# Patient Record
Sex: MALE | Race: ASIAN | URBAN - METROPOLITAN AREA
[De-identification: names, ages, dates, MRNs, and addresses within clinical notes are randomized per-mention and may not be internally consistent; named-entity substitution may affect disease eponyms.]

---

## 2018-05-31 DIAGNOSIS — H35.713 CENTRAL SEROUS CHORIORETINOPATHY OF BOTH EYES: Primary | ICD-10-CM

## 2018-06-14 ENCOUNTER — TELEPHONE (OUTPATIENT)
Dept: OPHTHALMOLOGY | Facility: CLINIC | Age: 63
End: 2018-06-14

## 2018-06-14 NOTE — TELEPHONE ENCOUNTER
Pt recently seen at [facility] by dr. Lambert  H/o central serus retinopathy    Pt been on spirolactone for one month and ketorolac and new improvement-- increase fluid after one month at exam 5-31-18    Referring to [place] for PDT    Note being faxed    Will review with facilitator scheduling and schedule pt  Nelson Higgins RN 7:54 AM 06/14/18

## 2018-06-18 DIAGNOSIS — H35.713 CENTRAL SEROUS CHORIORETINOPATHY OF BOTH EYES: Primary | ICD-10-CM

## 2018-07-12 ENCOUNTER — TELEPHONE (OUTPATIENT)
Dept: OPHTHALMOLOGY | Facility: CLINIC | Age: 63
End: 2018-07-12

## 2018-07-12 NOTE — TELEPHONE ENCOUNTER
----- Message from Rafaela Andrade sent at 7/10/2018 11:14 AM CDT -----  Thank you. I left a message via Tongan  at that phone number provided [phone].    Rafaela Andrade   - CAM & Infusion  [facility]  [email or web address]  [email or web address]   Office: [phone]  Fax: [phone]    ----- Message -----     From: Thea Medina COT     Sent: 7/9/2018   4:32 PM       To: Rafaela Russo,  Also got another number from his other clinic [phone]  ----- Message -----     From: Rafaela Andrade     Sent: 6/29/2018   2:49 PM       To: DELTA Roy,    I've tried calling this patient a couple of times now but could not leave a message. Please direct the patient to call me at [phone] if he reaches out to the clinic.    Thanks,    Rafaela Andrade   - CAM & Infusion  [facility]  [email or web address]  [email or web address]   Office: [phone]  Fax: [phone]    ----- Message -----     From: Thea Medina COT     Sent: 6/21/2018   7:42 AM       To: Dr Fausto Rojas from [facility] is the one referring the pt here for treatment. He wants us to call the pt and discuss cost so if you would be willing to call him I would appreciate that. You will need a Tongan  which makes it harder to explain things. Sorry for the inconvenience but THANKS A LOT  ----- Message -----     From: Rafaela Andrade     Sent: 6/19/2018   2:58 PM       To: DELTA Roy    No problem. If patient would still like to receive treatment, I can contact the patient about the cost of treatment.    Thanks,    Rafaela Andrade   - CAM & Infusion  [facility]  [email or web address]  [email or web address]   Office: [phone]  Fax: [phone]    ----- Message -----     From: Thea Medina COT     Sent: 6/19/2018   2:49 PM       To: Rafaela  Raymond    Oh that's right, thanks for the reminder   ----- Message -----     From: Rafaela Andrade     Sent: 6/19/2018   8:29 AM       To: DELTA oRy,    This is the same patient that I told you only had insurance coverage for behavioral health. She does not have any medical coverage to be seen at the eye clinic.    Rafaela Andrade   - CAM & Infusion  [facility]  [email or web address]  [email or web address]   Office: [phone]  Fax: [phone]    ----- Message -----     From: Thea Medina COT     Sent: 6/19/2018   8:13 AM       To: Rafaela quiroz for insurance check   [id number]  Thanks

## 2018-07-26 ENCOUNTER — TELEPHONE (OUTPATIENT)
Dept: OPHTHALMOLOGY | Facility: CLINIC | Age: 63
End: 2018-07-26

## 2018-07-26 NOTE — TELEPHONE ENCOUNTER
----- Message from Rafaela Andrade sent at 7/24/2018 12:01 PM CDT -----  Hello,    Patient finally called back and provided the correct insurance. He is good to go.    Thanks,    Rafaela Andrade   - CAM & Infusion  [facility]  [email or web address]  [email or web address]   Office: [phone]  Fax: [phone]    ----- Message -----     From: Thea Medina COT     Sent: 7/9/2018   4:32 PM       To: Rafaela Russo,  Also got another number from his other clinic [phone]  ----- Message -----     From: Rafaela Andrade     Sent: 6/29/2018   2:49 PM       To: DELTA Roy,    I've tried calling this patient a couple of times now but could not leave a message. Please direct the patient to call me at [phone] if he reaches out to the clinic.    Thanks,    Rafaela Andrade   - CAM & Infusion  [facility]  [email or web address]  [email or web address]   Office: [phone]  Fax: [phone]    ----- Message -----     From: Thea Medina COT     Sent: 6/21/2018   7:42 AM       To: Rafaela Russo,  Dr Lambert from [facility] is the one referring the pt here for treatment. He wants us to call the pt and discuss cost so if you would be willing to call him I would appreciate that. You will need a Malaysian  which makes it harder to explain things. Sorry for the inconvenience but THANKS A LOT  ----- Message -----     From: Rafaela Andrade     Sent: 6/19/2018   2:58 PM       To: DELTA Roy    No problem. If patient would still like to receive treatment, I can contact the patient about the cost of treatment.    Thanks,    Rafaela Andrade   - CAM & Infusion  [facility]  [email or web address]  [email or web address]   Office: [phone]  Fax: [phone]    ----- Message -----     From: Thea Medina COT     Sent: 6/19/2018   2:49 PM       To: Rafaela  Raymond    Oh that's right, thanks for the reminder   ----- Message -----     From: Rafaela Andrade     Sent: 6/19/2018   8:29 AM       To: DELTA Roy,    This is the same patient that I told you only had insurance coverage for behavioral health. She does not have any medical coverage to be seen at the eye clinic.    Rafaela Andrade   - CAM & Infusion  [facility]  [email or web address]  [email or web address]   Office: [phone]  Fax: [phone]    ----- Message -----     From: Thea Medina COT     Sent: 6/19/2018   8:13 AM       To: Rafaela quiroz for insurance check   [id number]  Thanks     Explained to pt to bring a , needs to stay out of sun x 3 days, will send pamphlet and new pt package in mail.  Phoned thru interp services.

## 2018-08-15 ENCOUNTER — OFFICE VISIT (OUTPATIENT)
Dept: OPHTHALMOLOGY | Facility: CLINIC | Age: 63
End: 2018-08-15
Attending: OPHTHALMOLOGY
Payer: COMMERCIAL

## 2018-08-15 VITALS — HEIGHT: 61 IN | BODY MASS INDEX: 21.84 KG/M2 | WEIGHT: 115.7 LBS

## 2018-08-15 DIAGNOSIS — H35.713 CENTRAL SEROUS CHORIORETINOPATHY OF BOTH EYES: Primary | ICD-10-CM

## 2018-08-15 PROCEDURE — 25000128 H RX IP 250 OP 636: Mod: ZF | Performed by: OPHTHALMOLOGY

## 2018-08-15 PROCEDURE — 92242 FLUORESCEIN&ICG ANGIOGRAPHY: CPT

## 2018-08-15 PROCEDURE — G0463 HOSPITAL OUTPT CLINIC VISIT: HCPCS | Mod: 25,ZF

## 2018-08-15 PROCEDURE — 92240 ICG ANGIOGRAPHY I&R UNI/BI: CPT | Mod: ZF | Performed by: OPHTHALMOLOGY

## 2018-08-15 PROCEDURE — 92250 FUNDUS PHOTOGRAPHY W/I&R: CPT | Mod: ZF | Performed by: OPHTHALMOLOGY

## 2018-08-15 PROCEDURE — 92134 CPTRZ OPH DX IMG PST SGM RTA: CPT | Mod: ZF | Performed by: OPHTHALMOLOGY

## 2018-08-15 PROCEDURE — 92235 FLUORESCEIN ANGRPH MLTIFRAME: CPT | Mod: ZF | Performed by: OPHTHALMOLOGY

## 2018-08-15 PROCEDURE — 67221 OCULAR PHOTODYNAMIC THER: CPT | Mod: LT,ZF | Performed by: OPHTHALMOLOGY

## 2018-08-15 RX ORDER — TADALAFIL 20 MG/1
20 TABLET ORAL PRN
COMMUNITY
Start: 2018-06-19

## 2018-08-15 RX ADMIN — VERTEPORFIN FOR INJECTION 9 MG: 15 INJECTION, POWDER, LYOPHILIZED, FOR SOLUTION INTRAVENOUS at 12:15

## 2018-08-15 ASSESSMENT — TONOMETRY
OS_IOP_MMHG: 11
OD_IOP_MMHG: 10
IOP_METHOD: ICARE

## 2018-08-15 ASSESSMENT — VISUAL ACUITY
OS_PH_SC: 20/70-2
OD_SC+: -1
OD_PH_SC: 20/70-2
OS_SC: 20/80
OD_SC: 20/100
METHOD: SNELLEN - LINEAR

## 2018-08-15 ASSESSMENT — CONF VISUAL FIELD
OD_NORMAL: 1
OS_NORMAL: 1
METHOD: COUNTING FINGERS

## 2018-08-15 ASSESSMENT — SLIT LAMP EXAM - LIDS
COMMENTS: NORMAL
COMMENTS: NORMAL

## 2018-08-15 NOTE — LETTER
"8/15/2018       RE: Kayode South  [address]  [place] [address]     Dear Meggan,    Thank you for referring your patient, Kaydoe South, [facility] Please see a copy of my visit note below.    CC: PDT evaluation for chronic Central serous chorioretinopathy    HPI: Kayode South is a  63 year old year-old patient referred by Dr Lambert from [facility] for evaluation and treat of possible Central serous retinopathy both eyes.  Has been having recurrences for approximately two years and have been evaluated by Dr. Castro in the past.    Had previously been on inspra but the patient stopped this as he did not feel that it was working. No recent history of steroid medication use (oral or inhalants).  He does have cialis on his med list but says he does not take it and got it prescribed for a friend.  Does smoke and drinks coffee daily. He feels that his visual \"black spot\" is larger than before in both eyes (left>>right).  He is hopeful to receive the laser therapy today.    Current Eye Meds:   Ketorolac both eyes three times a day    Retinal Imaging:    OCT 08/15/18   RE: Small pigment epithelial detachment, small pocket of subretinal fluid temporal macula increased compared to previous  LE:  > 338 > 445 Subretinal fluid increased, no intraretinal fluid    FAF 08/15/18  RE: mottled hyperAF temp juxafoveal and nasal macula with hypoAF juxtafoveally nasally  LE: well defined parafoveal hyperAF circumfrencial with surrounding hypoAF, central hypoAF    FA and ICG 08/15/18 transits left  Left eye: early hyperF dot superior to the fovea that expands/leaks into the late frames, also apparent on ICG  Right eye: multiple areas of hyperF foveal and nasal macula, also apparent on ICG       Assessment and Plan:    1. Central serous retinopathy, both eyes   - OCT shows evidence of prior subretinal fluid both eyes, current subretinal fluid left eye increased compared " to last Optical Coherence Tomography   - Discussed cessation of smoking, reducing coffee consumption, primary care physician evalation of sleep apnea, avoidance of steroids and viagra   - Discussed possibility of avastin injections as a treatment for central serous chorioretinopathy, patient explicitly prefers the laser therapy at this time after discussion   - Discussed possible need for multiple treatments with different methods for improvement   - R/B/A to PDT therapy in the left eye discussed at length, consent   - FA/ICG transits left with expansile dot appearance, both eyes   - PDT, left eye today    2. Nuclear Sclerosis, both eyes   - Observe    RTC in 4 weeks with OCT both eyes    Again, thank you for allowing me to participate in the care of your patient.      Sincerely,    Rhona Srivastava MD     Retina Service   Department of Ophthalmology and Visual Neurosciences   [facility]  Phone:  [phone]   Fax:  [phone]

## 2018-08-15 NOTE — LETTER
August 15, 2018      Re: Kayode South   1955    To Whom It May Concern:    This is to confirm that the above patient was seen on 8/15/2018.  Kayode South is unable to return to work til   Monday August 20, 2018.    Thank you for your cooperation in this matter.  Please do not hesitate to contact me if you have any further questions.    Sincerely,         [facility]

## 2018-08-15 NOTE — NURSING NOTE
"Chief Complaints and History of Present Illnesses   Patient presents with     Follow Up For     3mo recheck bilateral  + possible PDT LE     HPI    Affected eye(s):  Both   Symptoms:     Blurred vision   Decreased vision   Difficulty with reading   Inability to meet visual needs of job   No double vision   Floaters   No redness   No foreign body sensation   No tearing   Dryness      Duration:  3 months   Frequency:  Daily       Do you have eye pain now?:  No      Comments:  Accompanied by Israeli  Arturo and wife Freda    Per , Pt c/o intermittent blurriness, especially \"when the temperate changes,\" Pt will only be able to look at the computer for a few minutes at a time.     Wears SVL gls but c/o difficulty with vision at near and some in the distance. Gls are 2yrs old; didn't bring them in today. Is not interested in an MRx unless \"eyes are healthy,\" and \"Dr Srivastava recommends an update.\" Confirms DE symptoms, tx ATs PRN.     New Floaters LE since LV. \"Large black one,\" no Flashes.     Ocular meds: Ketorolac tid BE, ATs  Oriana Osborne COT 8:30 AM August 15, 2018                "

## 2018-08-15 NOTE — MR AVS SNAPSHOT
After Visit Summary   8/15/2018    Kayode South    MRN: [id number]           Patient Information     Date Of Birth          1955        Visit Information        Provider Department      8/15/2018 8:00 AM Rhona Srivastava MD; [facility]        Today's Diagnoses     Central serous chorioretinopathy of both eyes    -  1       Follow-ups after your visit        Follow-up notes from your care team     Return in about 4 weeks (around 9/12/2018) for Follow Up mac oct ou post PDT.      Your next 10 appointments already scheduled     Sep 12, 2018  8:00 AM CDT   RETURN RETINA with Rhona Srivastava MD   Eye Clinic ([facility])    [address]  [facility] [address] [phone]              Future tests that were ordered for you today     Open Future Orders        Priority Expected Expires Ordered    OCT Retina Spectralis OU (both eyes) Routine  2/16/2020 8/15/2018            Who to contact     Please call your clinic at [phone] to:    Ask questions about your health    Make or cancel appointments    Discuss your medicines    Learn about your test results    Speak to your doctor            Additional Information About Your Visit        MyChart Information     [employer] gives you secure access to your electronic health record. If you see a primary care provider, you can also send messages to your care team and make appointments. If you have questions, please call your primary care clinic.  If you do not have a primary care provider, please call [phone] and they will assist you.      [employer] is an electronic gateway that provides easy, online access to your medical records. With [employer], you can request a clinic appointment, read your test results, renew a prescription or communicate with your care team.     To access your existing account, please contact your [facility]  "Clinic or call [phone] for assistance.        Care EveryWhere ID     This is your Care EveryWhere ID. This could be used by other organizations to access your [facility] medical records  [id number]        Your Vitals Were     Height BMI (Body Mass Index)                1.549 m (5' 1\") 21.86 kg/m2           Blood Pressure from Last 3 Encounters:   No data found for BP    Weight from Last 3 Encounters:   08/15/18 52.5 kg (115 lb 11.2 oz)              We Performed the Following     Fluorescein Angiography OU (both eyes)     Fundus Autofluorescence Image (FAF) OU (both eyes)     ICG Angiography OU (both eyes)     OCT Retina Spectralis OU (both eyes)        Primary Care Provider    None Specified       No primary provider on file.        Equal Access to Services     JENNIFER OLIVAS : Sayra Campos, chuckie han, rachael reyes, urbano dias. So [facility] [phone].    ATENCIÓN: Si habla español, tiene a mitchell disposición servicios gratuitos de asistencia lingüística. Llame al [phone].    We comply with applicable federal civil rights laws and [place] laws. We do not discriminate on the basis of race, color, national origin, age, disability, sex, sexual orientation, or gender identity.            Thank you!     Thank you for choosing EYE CLINIC  for your care. Our goal is always to provide you with excellent care. Hearing back from our patients is one way we can continue to improve our services. Please take a few minutes to complete the written survey that you may receive in the mail after your visit with us. Thank you!             Your Updated Medication List - Protect others around you: Learn how to safely use, store and throw away your medicines at www.disposemymeds.org.          This list is accurate as of 8/15/18 12:31 PM.  Always use your most recent med list.                   Brand Name Dispense Instructions for use Diagnosis    ketorolac 0.5 % " ophthalmic solution    ACULAR     Place 1 drop into both eyes 3 times daily        * TADALAFIL PO           * tadalafil 20 MG tablet    CIALIS     Take 20 mg by mouth as needed        * Notice:  This list has 2 medication(s) that are the same as other medications prescribed for you. Read the directions carefully, and ask your doctor or other care provider to review them with you.

## 2018-08-15 NOTE — PROGRESS NOTES
"CC: PDT evaluation for chronic Central serous chorioretinopathy    HPI: Kayode South is a  63 year old year-old patient referred by Dr Lambert from [facility] for evaluation and treat of possible Central serous retinopathy both eyes.  Has been having recurrences for approximately two years and have been evaluated by Dr. Castro in the past.    Had previously been on inspra but the patient stopped this as he did not feel that it was working. No recent history of steroid medication use (oral or inhalants).  He does have cialis on his med list but says he does not take it and got it prescribed for a friend.  Does smoke and drinks coffee daily. He feels that his visual \"black spot\" is larger than before in both eyes (left>>right).  He is hopeful to receive the laser therapy today.    Current Eye Meds:   Ketorolac both eyes three times a day    Retinal Imaging:    OCT 08/15/18   RE: Small pigment epithelial detachment, small pocket of subretinal fluid temporal macula increased compared to previous  LE:  > 338 > 445 Subretinal fluid increased, no intraretinal fluid    FAF 08/15/18  RE: mottled hyperAF temp juxafoveal and nasal macula with hypoAF juxtafoveally nasally  LE: well defined parafoveal hyperAF circumfrencial with surrounding hypoAF, central hypoAF    FA and ICG 08/15/18 transits left  Left eye: early hyperF dot superior to the fovea that expands/leaks into the late frames, also apparent on ICG  Right eye: multiple areas of hyperF foveal and nasal macula, also apparent on ICG       Assessment and Plan:    1. Central serous retinopathy, both eyes   - OCT shows evidence of prior subretinal fluid both eyes, current subretinal fluid left eye increased compared to last Optical Coherence Tomography   - Discussed cessation of smoking, reducing coffee consumption, primary care physician evalation of sleep apnea, avoidance of steroids and viagra   - Discussed possibility of avastin injections as a treatment for " central serous chorioretinopathy, patient explicitly prefers the laser therapy at this time after discussion   - Discussed possible need for multiple treatments with different methods for improvement   - R/B/A to PDT therapy in the left eye discussed at length, consent   - FA/ICG transits left with expansile dot appearance, both eyes   - PDT, left eye today    2. Nuclear Sclerosis, both eyes   - Observe    RTC in 4 weeks with OCT both eyes    Ramesh Lu M.D.  PGY-3, Ophthalmology     ~~~~~~~~~~~~~~~~~~~~~~~~~~~~~~~~~~   Complete documentation of historical and exam elements from today's encounter can be found in the full encounter summary report (not reduplicated in this progress note).  I personally obtained the chief complaint(s) and history of present illness.  I confirmed and edited as necessary the review of systems, past medical/surgical history, family history, social history, and examination findings as documented by others; and I examined the patient myself.  I personally reviewed the relevant tests, images, and reports as documented above.  I formulated and edited as necessary the assessment and plan and discussed the findings and management plan with the patient and family and No resident or fellow assisted with the procedures performed.  I performed the procedures myself.    Rhona Srivastava MD   of Ophthalmology.  Retina Service   Department of Ophthalmology and Visual Neurosciences   [facility]  Phone: [phone]   Fax: [phone]

## 2018-08-17 ENCOUNTER — TELEPHONE (OUTPATIENT)
Dept: OPHTHALMOLOGY | Facility: CLINIC | Age: 63
End: 2018-08-17

## 2018-08-17 NOTE — TELEPHONE ENCOUNTER
"The patient called asking what he should do after \"eye surgery\".  He had a laser last week.  He notes his eyes are comfortable.  I mentioned that he could use OTC artificial tears.  He asked if he could take a shower.  I went through the general notes on the doctors visit note and he states he understands about smoking cessation.  The patient thanked me for answering his questions.   "

## 2018-08-22 ENCOUNTER — TELEPHONE (OUTPATIENT)
Dept: OPHTHALMOLOGY | Facility: CLINIC | Age: 63
End: 2018-08-22

## 2018-08-22 NOTE — TELEPHONE ENCOUNTER
[facility] Call Center    Phone Message    May a detailed message be left on voicemail: yes    Reason for Call: Other: PT called stating he has not felt any change since his procedure done by Dr. Srivastava last Tuesday. Pt wants call on what to do.     Action Taken: Message routed to:  [facility] ([facility] Ophthamology

## 2018-08-23 ENCOUNTER — TELEPHONE (OUTPATIENT)
Dept: OPHTHALMOLOGY | Facility: CLINIC | Age: 63
End: 2018-08-23

## 2018-08-23 NOTE — TELEPHONE ENCOUNTER
[facility] Call Center    Phone Message    May a detailed message be left on voicemail: yes    Reason for Call: Symptoms or Concerns     If patient has red-flag symptoms, warm transfer to triage line    Current symptom or concern: Per pt, had eye surgery/Lasix on 8/15/2018, but vision has not changed. Please follow up.    Symptoms have been present for:  1 week(s)    Has patient previously been seen for this? Yes    By Dr. Srivastava    Date: 8/15/2018    Are there any new or worsening symptoms? No      Action Taken: Message routed to:  [facility] ([facility] EYE

## 2018-08-24 NOTE — TELEPHONE ENCOUNTER
Pt. Was confused with PDT laser procedure he had done. He had talked to friends that had Lasik surgery and told him their vision improved significantly right away. I explained to patient that there are many different types of lasers. I explained that PDT is used to attempt to stabilize and reduce the fluid. Pt. Understood that it guallpa not dramatically improve vision like lasik would.     Kim SORENSON 7:58 AM August 24, 2018

## 2018-09-12 ENCOUNTER — OFFICE VISIT (OUTPATIENT)
Dept: OPHTHALMOLOGY | Facility: CLINIC | Age: 63
End: 2018-09-12
Attending: OPHTHALMOLOGY
Payer: COMMERCIAL

## 2018-09-12 DIAGNOSIS — H35.713 CENTRAL SEROUS CHORIORETINOPATHY OF BOTH EYES: ICD-10-CM

## 2018-09-12 PROCEDURE — 92134 CPTRZ OPH DX IMG PST SGM RTA: CPT | Mod: ZF | Performed by: OPHTHALMOLOGY

## 2018-09-12 PROCEDURE — T1013 SIGN LANG/ORAL INTERPRETER: HCPCS | Mod: U3,ZF

## 2018-09-12 PROCEDURE — G0463 HOSPITAL OUTPT CLINIC VISIT: HCPCS | Mod: ZF

## 2018-09-12 ASSESSMENT — SLIT LAMP EXAM - LIDS
COMMENTS: NORMAL
COMMENTS: NORMAL

## 2018-09-12 ASSESSMENT — VISUAL ACUITY
OS_PH_SC: 20/60
OD_SC: 20/80
METHOD: SNELLEN - LINEAR
OS_SC: 20/80
OD_PH_SC: 20/70

## 2018-09-12 ASSESSMENT — TONOMETRY
OS_IOP_MMHG: 17
IOP_METHOD: TONOPEN
OD_IOP_MMHG: 16

## 2018-09-12 ASSESSMENT — CONF VISUAL FIELD
OS_NORMAL: 1
OD_NORMAL: 1

## 2018-09-12 NOTE — PROGRESS NOTES
"CC: PDT evaluation for chronic Central serous chorioretinopathy    HPI: Kayode South is a  63 year old year-old patient referred by Dr Lambert from [facility] for evaluation and treat of possible Central serous retinopathy both eyes.  Has been having recurrences for approximately two years and have been evaluated by Dr. Castro in the past.    Had previously been on inspra but the patient stopped this as he did not feel that it was working. No recent history of steroid medication use (oral or inhalants).  He does have cialis on his med list but says he does not take it and got it prescribed for a friend.  Does smoke and drinks coffee daily. He feels that his visual \"black spot\" is larger than before in both eyes (left>>right).  He is hopeful to receive the laser therapy today.    Current Eye Meds:   Ketorolac both eyes three times a day    Retinal Imaging:    OCT 9-12-18  RE: Small pigment epithelial detachment, small pocket of subretinal fluid temporal macula stable compared to previous  LE:  > 338 > 445 > 232 Subretinal fluid significantly improved- resolving    FAF 08/15/18  RE: mottled hyperAF temp juxafoveal and nasal macula with hypoAF juxtafoveally nasally  LE: well defined parafoveal hyperAF circumfrencial with surrounding hypoAF, central hypoAF    FA and ICG 08/15/18 transits left  Left eye: early hyperF dot superior to the fovea that expands/leaks into the late frames, also apparent on ICG  Right eye: multiple areas of hyperF foveal and nasal macula, also apparent on ICG     Assessment and Plan:    1. Central serous retinopathy, both eyes   - status post photodynamic therapy (PDT) 8.15.18   - Optical Coherence Tomography showing resolution of subretinal fluid - observe for now   - Discussed cessation of smoking, reducing coffee consumption, primary care physician evalation of sleep apnea, avoidance of steroids and viagra   - could consider avastin injections and/vs photodynamic therapy (PDT) if fluid " returns or worsens   - Discussed possible need for multiple treatments with different methods for improvement   -observe for now     2. Nuclear Sclerosis, both eyes   - Observe    return to clinic retina in 6 weeks with OCT both eyes  Follow up with Dr. He and low vision rehab    ~~~~~~~~~~~~~~~~~~~~~~~~~~~~~~~~~~   Complete documentation of historical and exam elements from today's encounter can be found in the full encounter summary report (not reduplicated in this progress note).  I personally obtained the chief complaint(s) and history of present illness.  I confirmed and edited as necessary the review of systems, past medical/surgical history, family history, social history, and examination findings as documented by others; and I examined the patient myself.  I personally reviewed the relevant tests, images, and reports as documented above.  I formulated and edited as necessary the assessment and plan and discussed the findings and management plan with the patient and family and No resident or fellow assisted with the procedures performed.  I performed the procedures myself.    Rhona Srivastava MD   of Ophthalmology.  Retina Service   Department of Ophthalmology and Visual Neurosciences   [facility]  Phone: [phone]   Fax: [phone]

## 2018-09-12 NOTE — NURSING NOTE
Chief Complaints and History of Present Illnesses   Patient presents with     Follow Up For     Central serous chorioretinopathy of both eyes (Primary Dx)     HPI    Affected eye(s):  Both   Symptoms:     Blurred vision   No decreased vision   No floaters   No flashes      Duration:  4 weeks   Frequency:  Constant       Do you have eye pain now?:  No      Comments:  States va is the same since last visit  Ketorolac both eyes three times a day  Oleg Valles  8:38 AM September 12, 2018

## 2018-09-12 NOTE — MR AVS SNAPSHOT
After Visit Summary   9/12/2018    Kayode South    MRN: [id number]           Patient Information     Date Of Birth          1955        Visit Information        Provider Department      9/12/2018 8:00 AM Lopes, Son; Rhona Srivastava MD [facility]        Today's Diagnoses     Central serous chorioretinopathy of both eyes           Follow-ups after your visit        Follow-up notes from your care team     Return in about 6 weeks (around 10/24/2018) for OCT.      Your next 10 appointments already scheduled     Oct 01, 2018  8:30 AM CDT   Evaluation with Gala Daley OT   [facility], Occupational Therapy, Vision - Outpatie ([facility])    [address]  9th Floor, Clinic 9a  [facility] [id number] [phone]            Oct 31, 2018  7:30 AM CDT   RETURN RETINA with Rhona Srivastava MD   Eye Clinic ([facility])    [address]  [address] Clin [address]  [facility] [id number] [phone]            Nov 07, 2018 10:20 AM CST   (Arrive by 10:05 AM)   New Low Vision with Jerzy He OD   [facility] Op[facility] ([facility])    [address]  4th Floor  [place] [address] [phone]              Who to contact     Please call your clinic at [phone] to:    Ask questions about your health    Make or cancel appointments    Discuss your medicines    Learn about your test results    Speak to your doctor            Additional Information About Your Visit        MyChart Information     [employer] gives you secure access to your electronic health record. If you see a primary care provider, you can also send messages to your care team and make appointments. If you have questions, please call your primary care clinic.  If you do not have a primary care provider, please call [phone] and they will assist you.      Fridge is an electronic  gateway that provides easy, online access to your medical records. With [email or web address], you can request a clinic appointment, read your test results, renew a prescription or communicate with your care team.     To access your existing account, please contact your [facility] or call [phone] for assistance.        Care EveryWhere ID     This is your Care EveryWhere ID. This could be used by other organizations to access your [place] medical records  [id number]         Blood Pressure from Last 3 Encounters:   No data found for BP    Weight from Last 3 Encounters:   08/15/18 52.5 kg (115 lb 11.2 oz)              We Performed the Following     OCT Retina Spectralis OU (both eyes)        Primary Care Provider    None Specified       No primary provider on file.        Equal Access to Services     JENNIFER OLIVAS : Sayra Campos, chuckie han, rachael reyes, urbano gaston . So [facility] [phone].    ATENCIÓN: Si habla español, tiene a mitchell disposición servicios gratuitos de asistencia lingüística. Llame al [phone].    We comply with applicable federal civil rights laws and [place] laws. We do not discriminate on the basis of race, color, national origin, age, disability, sex, sexual orientation, or gender identity.            Thank you!     Thank you for choosing EYE CLINIC  for your care. Our goal is always to provide you with excellent care. Hearing back from our patients is one way we can continue to improve our services. Please take a few minutes to complete the written survey that you may receive in the mail after your visit with us. Thank you!             Your Updated Medication List - Protect others around you: Learn how to safely use, store and throw away your medicines at www.disposemymeds.org.          This list is accurate as of 9/12/18  9:33 AM.  Always use your most recent med list.                   Brand Name Dispense  Instructions for use Diagnosis    ketorolac 0.5 % ophthalmic solution    ACULAR     Place 1 drop into both eyes 3 times daily        * TADALAFIL PO           * tadalafil 20 MG tablet    CIALIS     Take 20 mg by mouth as needed        * Notice:  This list has 2 medication(s) that are the same as other medications prescribed for you. Read the directions carefully, and ask your doctor or other care provider to review them with you.

## 2018-10-01 ENCOUNTER — HOSPITAL ENCOUNTER (OUTPATIENT)
Dept: OCCUPATIONAL THERAPY | Facility: CLINIC | Age: 63
Discharge: HOME | End: 2018-10-01
Attending: OCCUPATIONAL THERAPIST | Admitting: OCCUPATIONAL THERAPIST
Payer: COMMERCIAL

## 2018-10-01 PROCEDURE — 97535 SELF CARE MNGMENT TRAINING: CPT | Mod: GO | Performed by: OCCUPATIONAL THERAPIST

## 2018-10-01 PROCEDURE — 97165 OT EVAL LOW COMPLEX 30 MIN: CPT | Mod: GO | Performed by: OCCUPATIONAL THERAPIST

## 2018-10-01 PROCEDURE — 40000249 ZZH STATISTIC VISIT LOW VISION CLINIC: Performed by: OCCUPATIONAL THERAPIST

## 2018-10-01 ASSESSMENT — VISUAL ACUITY
OS: 20/80
OD: 20/80

## 2018-10-01 ASSESSMENT — ACTIVITIES OF DAILY LIVING (ADL): PRIOR_ADL/IADL_STATUS: INDEPENDENT PRIOR TO ONSET

## 2018-10-01 NOTE — PROGRESS NOTES
10/01/18 0800   Visit Type   Type of Visit Initial       Present Yes   Language (Cymraes)   General Information   Start Of Care Date 10/01/18   Referring Physician Rhona Srivastava MD   Orders Evaluate And Treat As Indicated   Date of Order 10/01/18   Medical Diagnosis central serous retinopathy    Onset Of Illness/injury Or Date Of Surgery 10/01/2018   Surgical/Medical history reviewed Yes  ( stomach problems, night sweats)   Prior ADL/IADL status Independent prior to onset   Patient/family Goals Statement safety working with [facility], reading, visual endurance   Social History/Home Environment   [place], 27 year old son[place]   Current Community Support (no hired assist)   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 No   Have you fallen and had an injury in the past year? No   Is patient a fall risk? No   Cognitive/Behavioral   Communication Intact   Cognitive Status Intact for evaluation process   Behavior Appropriate   Patient/family aware of diagnosis No   How well do you understand your eye condition? Not well   Vision related restrictions on visiting with family/friends Moderate   Reported emotional impact of visual impairment Moderate   Adjustment to disability Good   Physical Status/Equipment   Physical Status No problems observed/reported   Visual Report   Functional Complaints Reading;Work related tasks   Visual Complaints Visual fatigue;Difficulty maintaining focus   Sanjiv Bonnet Symptoms? No   Magnifier (strength and type) none   Reading glasses Single Lens  (OTC - +2.75)   Technology Computer  ([employer], [employer] - diffficulty seeing computer)   Lighting and Glare   Is your lighting  "adequate? Yes/ at home;Yes/ at work   Is glare a problem? No/ indoors;No/ outdoors   Are you satisfied with your sunglasses? Yes   Sunglass filter color Gray   Visual Acuity   Acuity right eye 20/80   Acuity left eye 20/80   Contrast Sensitivity   Contrast sensitivity (score/25) 25/25   [place] Read   Smallest print size read 0.5M   Critical print size 0.8M   Words per minute at critical print size Not measured secondary to English as second language   Dynavision: Evaluation of visual skills/search of extra personal space via 5X4 foot computerized light board with 64 stimuli.  The user reacts as quickly as possible by striking the lights as they turn on in random succession.   Dynavision Cascade Dynavision Mode A (single stimulus attention, 1 minute;Dynavision Mode B Timed Divided Attention (1-second flash rate, 1 minute)   Dynavision Mode A (single stimulus attention, 1 minute)   Patient Score (Mode A, 1 min) 60   Dynavision Mode A (SSA, 1 Min) Comment \"safe \" score: 52+   Dynavision Mode B Timed Divided Attention (1-second flash rate, 1 minute)   Patient score, targets struck 52   Patient score, central stimulus identified (out of 10): calculate % 10/10   Dynavision Mode B Timed Divided Attention Comment Safe  score: 35+, 10/10   SRAFVP SCORING   # relevant measures 38   Composite score 66   Percentage of disability (%) 13.16   Education   Learner Patient   Readiness Eager;Acceptance   Method Booklet/handout;Explanation;Demonstration   Response Verbalizes understanding;Demonstrates understanding;Needs reinforcement   Clinical Impression, OT Eval   Criteria for Skilled Therapeutic Interventions Met yes;treatment indicated   Therapy  Diagnosis: Impaired ADL/IADL with deficits in Reading based ADL;Financial management;Work performance  (fishing line and hook, seeing detail in work place)   Assessment of Occupational Performance 3-5 Performance Deficits   Identified Performance Deficits as above   Clinical " Decision Making (Complexity) Low complexity   OT Visual Rehabilitation Evaluation Plan   Therapy Plan Occupational therapy intervention   Planned Interventions Low vision compensatory training for reading;Low vision compensatory trainging for financial management;Instruction in environmental adaptations for lighting;Optical device/ADL device instruction and training;Computer modifications;Instruction in community resources   Frequency / Duration 2 tx visits over 12 weeks. Second visit to occur after low vision refraction with Dr. He and with new glasses in hand   Risks and Benefits of Treatment have been explained. Yes   Patient, Family in agreement with plan of care Yes   GOALS   Goals Near Vision;Environmental Modification;Resource Education   Goals Addressed this Session Near vision;Environmental modification;Resource education   Goal 1 - Near Vision   Goal Description: Near Vision Patient will verbalize awareness of visual field Loss and demonstrate improved use of visual skills/adaptive equipment for increased independence in reading-based activities of daily living, written communication and detail ADL tasks.   Target Date 12/24/18   Goal 3 - Environmental modification   Goal Description: Environment modification Patient will demonstrate understanding of the impact of lighting, contrast and glare on ADL activities, in conjunction with environmentally-based ADL modifications   Target Date 12/24/18   Goal 4 - Resource education   Goal Description: Resource education Patient will verbalize awareness of community resources for the following:;Audio access to print materials;Access to large print materials;Access to low vision devices;Support in vocational goals   Target Date 12/24/18   Total Evaluation Time   Total Evaluation Time 45   Therapy Certification   Certification date from 10/01/18   Certification date to 12/24/18   Medical Diagnosis central serous retinopathy

## 2018-10-01 NOTE — DISCHARGE INSTRUCTIONS
Visual Rehabilitation Summary  1. Your vision results:  a. Your visual acuity, sharpness of vision is 20/80 in each eye without glasses.  20/20 is normal vision.  b. Your peripheral vision appears to be good as measured on the light board.  c. Your vision to see things as they become lighter, as measured by the number chart, was normal.  2. Recommendations  a. Make an appointment with Dr. Jerzy He for low vision refraction.  i. Consider a full lens working lens for your working distance.  b. Consider additional lighting as needed.  i. Either a light attached to your glasses or headband light.   c. After you receive your new work glasses, make one follow up appointment with me to consider other things that could be helpful to you.  We will look at computer settings to make it easier to see.  Gala Daley, OTR/L  [phone]

## 2018-10-31 ENCOUNTER — OFFICE VISIT (OUTPATIENT)
Dept: OPHTHALMOLOGY | Facility: CLINIC | Age: 63
End: 2018-10-31
Attending: OPHTHALMOLOGY
Payer: COMMERCIAL

## 2018-10-31 DIAGNOSIS — H35.713 CENTRAL SEROUS CHORIORETINOPATHY OF BOTH EYES: Primary | ICD-10-CM

## 2018-10-31 DIAGNOSIS — H35.713 CENTRAL SEROUS CHORIORETINOPATHY OF BOTH EYES: ICD-10-CM

## 2018-10-31 PROCEDURE — G0463 HOSPITAL OUTPT CLINIC VISIT: HCPCS | Mod: ZF

## 2018-10-31 PROCEDURE — T1013 SIGN LANG/ORAL INTERPRETER: HCPCS | Mod: U3,ZF

## 2018-10-31 PROCEDURE — 92134 CPTRZ OPH DX IMG PST SGM RTA: CPT | Mod: ZF | Performed by: OPHTHALMOLOGY

## 2018-10-31 ASSESSMENT — CONF VISUAL FIELD
OS_NORMAL: 1
METHOD: COUNTING FINGERS
OD_NORMAL: 1

## 2018-10-31 ASSESSMENT — VISUAL ACUITY
OD_PH_SC: 20/30-1
OD_SC: 20/50
OS_SC: 20/60
METHOD: SNELLEN - LINEAR
OS_PH_SC: 20/40

## 2018-10-31 ASSESSMENT — TONOMETRY
IOP_METHOD: TONOPEN
OD_IOP_MMHG: 13
OS_IOP_MMHG: 13

## 2018-10-31 ASSESSMENT — REFRACTION_WEARINGRX
OS_CYLINDER: +0.50
SPECS_TYPE: PROG
OS_SPHERE: -1.25
OS_ADD: +2.50
OD_AXIS: 170
OD_ADD: +2.50
OS_AXIS: 10
OD_SPHERE: -1.00
OD_CYLINDER: +1.00

## 2018-10-31 ASSESSMENT — SLIT LAMP EXAM - LIDS
COMMENTS: NORMAL
COMMENTS: NORMAL

## 2018-10-31 NOTE — PROGRESS NOTES
"CC: PDT evaluation for chronic Central serous chorioretinopathy    HPI: Kayode South is a  63 year old year-old patient referred by Dr Lambert from [facility] for evaluation and treat of possible Central serous retinopathy both eyes.  Has been having recurrences for approximately two years and have been evaluated by Dr. Castro in the past.    Had previously been on inspra but the patient stopped this as he did not feel that it was working. No recent history of steroid medication use (oral or inhalants).  He does have cialis on his med list but says he does not take it and got it prescribed for a friend.  Does smoke and drinks coffee daily. He feels that his visual \"black spot\" is larger than before in both eyes (left>>right).  He is hopeful to receive the laser therapy today.    Current Eye Meds:   Ketorolac both eyes three times a day    Retinal Imaging:    OCT 10-31-18  RE: Small pigment epithelial detachment, small pocket of subretinal fluid nasal macula stable compared to previous  LE: Subretinal fluid significantly improved- resolved    FAF 08/15/18  RE: mottled hyperAF temp juxafoveal and nasal macula with hypoAF juxtafoveally nasally  LE: well defined parafoveal hyperAF circumfrencial with surrounding hypoAF, central hypoAF    FA and ICG 08/15/18 transits left  Left eye: early hyperF dot superior to the fovea that expands/leaks into the late frames, also apparent on ICG  Right eye: multiple areas of hyperF foveal and nasal macula, also apparent on ICG     Assessment and Plan:    1. Central serous retinopathy, both eyes   - status post photodynamic therapy (PDT) 8.15.18 by SM   - Optical Coherence Tomography showing resolution of subretinal fluid - observe for now   - Discussed cessation of smoking, reducing coffee consumption, primary care physician evalation of sleep apnea, avoidance of steroids and viagra   - could consider avastin injections and/vs photodynamic therapy (PDT) if fluid returns or worsens   - " Discussed possible need for multiple treatments with different methods for improvement   -observe for now     2. Nuclear Sclerosis, both eyes   - Observe    3. Dry eye syndrome  artificial tears  Three times a day   warm compresses     return to clinic retina in 3 months with OCT both eyes  ~~~~~~~~~~~~~~~~~~~~~~~~~~~~~~~~~~   Complete documentation of historical and exam elements from today's encounter can be found in the full encounter summary report (not reduplicated in this progress note).  I personally obtained the chief complaint(s) and history of present illness.  I confirmed and edited as necessary the review of systems, past medical/surgical history, family history, social history, and examination findings as documented by others; and I examined the patient myself.  I personally reviewed the relevant tests, images, and reports as documented above.  I personally reviewed the ophthalmic test(s) associated with this encounter, agree with the interpretation(s) as documented by the resident/fellow, and have edited the corresponding report(s) as necessary.   I formulated and edited as necessary the assessment and plan and discussed the findings and management plan with the patient and family    Rhona Srivastava MD   of Ophthalmology.  Retina Service   Department of Ophthalmology and Visual Neurosciences   [facility]  Phone: [phone]   Fax: [phone]

## 2018-10-31 NOTE — NURSING NOTE
Chief Complaints and History of Present Illnesses   Patient presents with     Follow Up For     6 week follow up Central serous retinopathy, both eyes     HPI    Affected eye(s):  Both   Symptoms:     No floaters   No flashes   No redness   No tearing   No Dryness         Do you have eye pain now?:  No      Comments:  Pt scheduled with Dr He next week for Low vision. No changes in vision per pt.  Slight irritation in RE since yesterday, relief with drops.    Justina ONEAL October 31, 2018 8:05 AM

## 2018-10-31 NOTE — MR AVS SNAPSHOT
After Visit Summary   10/31/2018    Kayode South    MRN: [id number]           Patient Information     Date Of Birth          1955        Visit Information        Provider Department      10/31/2018 7:30 AM Lopes, Son; Rhona Srivastava MD [facility]        Today's Diagnoses     Central serous chorioretinopathy of both eyes           Follow-ups after your visit        Follow-up notes from your care team     Return in about 3 months (around 1/31/2019) for OCT.      Your next 10 appointments already scheduled     Nov 07, 2018 10:20 AM CST   (Arrive by 10:05 AM)   New Low Vision with Jerzy He OD   [facility]mology ([facility])    [address]  4th Floor  [place] [facility] [address] [phone]            Jan 30, 2019  7:15 AM CST   RETURN RETINA with Rhona Srivastava MD   Eye Clinic ([facility])    [address]  [address] [address]  [facility] [address] [phone]              Who to contact     Please call your clinic at [phone] to:    Ask questions about your health    Make or cancel appointments    Discuss your medicines    Learn about your test results    Speak to your doctor            Additional Information About Your Visit        [employer] Backstage Information     angelMD gives you secure access to your electronic health record. If you see a primary care provider, you can also send messages to your care team and make appointments. If you have questions, please call your primary care clinic.  If you do not have a primary care provider, please call [phone] and they will assist you.      angelMD is an electronic gateway that provides easy, online access to your medical records. With angelMD, you can request a clinic appointment, read your test results, renew a prescription or communicate with your care team.     To access your existing account, please contact your [facility] or call [phone] for assistance.        Care EveryWhere ID     This is your Care EveryWhere ID. This could be used by other organizations to access your [place] medical records  [id number]         Blood Pressure from Last 3 Encounters:   No data found for BP    Weight from Last 3 Encounters:   08/15/18 52.5 kg (115 lb 11.2 oz)              We Performed the Following     OCT Retina Spectralis OU (both eyes)          Today's Medication Changes          These changes are accurate as of 10/31/18  8:46 AM.  If you have any questions, ask your nurse or doctor.               These medicines have changed or have updated prescriptions.        Dose/Directions    tadalafil 20 MG tablet   Commonly known as:  CIALIS   This may have changed:  Another medication with the same name was removed. Continue taking this medication, and follow the directions you see here.   Changed by:  Rhona Srivastava MD        Dose:  20 mg   Take 20 mg by mouth as needed   Refills:  0                Primary Care Provider    None Specified       No primary provider on file.        Equal Access to Services     [facility] : Hadmoises Campos, wasunny han, qamichell reyes, urbano gaston . So [facility] [phone].    ATENCIÓN: Si habla español, tiene a mitchell disposición servicios gratuitos de asistencia lingüística. Llame al [phone].    We comply with applicable federal civil rights laws and [place] laws. We do not discriminate on the basis of race, color, national origin, age, disability, sex, sexual orientation, or gender identity.            Thank you!     Thank you for choosing EYE CLINIC  for your care. Our goal is always to provide you with excellent care. Hearing back from our patients is one way we can continue to improve our services. Please take a few minutes to complete the written survey that you may receive in the mail after your visit with us. Thank  you!             Your Updated Medication List - Protect others around you: Learn how to safely use, store and throw away your medicines at www.disposemymeds.org.          This list is accurate as of 10/31/18  8:46 AM.  Always use your most recent med list.                   Brand Name Dispense Instructions for use Diagnosis    ARTIFICIAL TEARS OP      Apply to eye as needed        tadalafil 20 MG tablet    CIALIS     Take 20 mg by mouth as needed

## 2018-11-05 ENCOUNTER — OFFICE VISIT (OUTPATIENT)
Dept: OPHTHALMOLOGY | Facility: CLINIC | Age: 63
End: 2018-11-05
Attending: OPHTHALMOLOGY
Payer: COMMERCIAL

## 2018-11-05 DIAGNOSIS — H35.713 CENTRAL SEROUS CHORIORETINOPATHY OF BOTH EYES: ICD-10-CM

## 2018-11-05 DIAGNOSIS — H10.13 ALLERGIC CONJUNCTIVITIS OF BOTH EYES: Primary | ICD-10-CM

## 2018-11-05 PROCEDURE — G0463 HOSPITAL OUTPT CLINIC VISIT: HCPCS | Mod: ZF

## 2018-11-05 RX ORDER — FLUOROMETHOLONE 0.1 %
1 SUSPENSION, DROPS(FINAL DOSAGE FORM)(ML) OPHTHALMIC (EYE) 4 TIMES DAILY
Qty: 1 BOTTLE | Refills: 0 | Status: SHIPPED | OUTPATIENT
Start: 2018-11-05 | End: 2018-11-07

## 2018-11-05 RX ORDER — FEXOFENADINE HCL 180 MG/1
180 TABLET ORAL DAILY
Qty: 30 TABLET | Refills: 1 | Status: SHIPPED | OUTPATIENT
Start: 2018-11-05

## 2018-11-05 ASSESSMENT — VISUAL ACUITY
METHOD: SNELLEN - LINEAR
OD_PH_SC: 20/200
OS_SC: 20/100
OS_PH_SC: 20/60
OD_SC: 20/400

## 2018-11-05 ASSESSMENT — TONOMETRY
IOP_METHOD: ICARE
OS_IOP_MMHG: 21
OD_IOP_MMHG: 19

## 2018-11-05 ASSESSMENT — SLIT LAMP EXAM - LIDS
COMMENTS: EDEMA
COMMENTS: EDEMA

## 2018-11-05 NOTE — NURSING NOTE
Chief Complaints and History of Present Illnesses   Patient presents with     Consult For     watery, irritated eyes bilateral     HPI    Affected eye(s):  Both   Symptoms:     Blurred vision   No floaters   No flashes   Redness   Tearing   Itching   Photophobia      Duration:  6 days   Frequency:  Constant       Do you have eye pain now?:  No      Comments:  Patients presents for watery irritated eyes x's 6 days. Since then, the vision has been blurry at distance and near. The eyes are red, itchy, swollen, photophobic and at times it is hard to open the eyes. Using Refresh tears 5-6 x's/day and Ketorolac 1-2x's /day. No flashes or floaters. Masha Leger COT 8:04 AM November 5, 2018

## 2018-11-05 NOTE — MR AVS SNAPSHOT
After Visit Summary   11/5/2018    Kayode South    MRN: [id number]           Patient Information     Date Of Birth          1955        Visit Information        Provider Department      11/5/2018 7:45 AM Leonard Camejo MD [facility]        Today's Diagnoses     Allergic conjunctivitis of both eyes    -  1       Follow-ups after your visit        Follow-up notes from your care team     Return in about 1 week (around 11/12/2018).      Your next 10 appointments already scheduled     Nov 12, 2018  8:30 AM CST   RETURN GENERAL with Leonard Camejo MD   Eye Clinic ([facility])    [address] [place] [address]   [phone]            Jan 30, 2019  7:15 AM CST   RETURN RETINA with Rhona Srivastava MD   Eye Clinic ([facility])    [address] [place] [address]   [phone]              Who to contact     Please call your clinic at [phone] to:    Ask questions about your health    Make or cancel appointments    Discuss your medicines    Learn about your test results    Speak to your doctor            Additional Information About Your Visit        V2contactharBotanical Tans Information     Zulama gives you secure access to your electronic health record. If you see a primary care provider, you can also send messages to your care team and make appointments. If you have questions, please call your primary care clinic.  If you do not have a primary care provider, please call [phone] and they will assist you.      Zulama is an electronic gateway that provides easy, online access to your medical records. With Zul[employer], you can request a clinic appointment, read your test results, renew a prescription or communicate with your care team.     To access your existing account, please contact your [facility] or call [phone] for assistance.        Care  EveryWhere ID     This is your Care EveryWhere ID. This could be used by other organizations to access your [facility] medical records  [id number]         Blood Pressure from Last 3 Encounters:   No data found for BP    Weight from Last 3 Encounters:   08/15/18 52.5 kg (115 lb 11.2 oz)              Today, you had the following     No orders found for display         Today's Medication Changes          These changes are accurate as of 11/5/18  9:07 AM.  If you have any questions, ask your nurse or doctor.               Start taking these medicines.        Dose/Directions    fexofenadine 180 MG tablet   Commonly known as:  ALLEGRA   Used for:  Allergic conjunctivitis of both eyes   Started by:  Leonard Camejo MD        Dose:  180 mg   Take 1 tablet (180 mg) by mouth daily   Quantity:  30 tablet   Refills:  1       fluorometholone 0.1 % ophthalmic susp   Commonly known as:  FML LIQUIFILM   Used for:  Allergic conjunctivitis of both eyes   Started by:  Leonard Camejo MD        Dose:  1 drop   Place 1 drop into both eyes 4 times daily   Quantity:  1 Bottle   Refills:  0            Where to get your medicines      These medications were sent to [facility]/pharmacy #[id number] [address] [address]     Phone:  [phone]     fexofenadine 180 MG tablet    fluorometholone 0.1 % ophthalmic susp                Primary Care Provider    None Specified       No primary provider on file.        Equal Access to Services     JENNIFER OLIVAS : Sayra Campos, chuckie han, qaybta kaaljonathan reyes, urbano dias. So [facility] [phone].    ATENCIÓN: Si habla español, tiene a mitchell disposición servicios gratuitos de asistencia lingüística. Yoav al [phone].    We comply with applicable federal civil rights laws and [place] laws. We do not discriminate on the basis of race, color, national origin, age, disability, sex, sexual  orientation, or gender identity.            Thank you!     Thank you for choosing EYE CLINIC  for your care. Our goal is always to provide you with excellent care. Hearing back from our patients is one way we can continue to improve our services. Please take a few minutes to complete the written survey that you may receive in the mail after your visit with us. Thank you!             Your Updated Medication List - Protect others around you: Learn how to safely use, store and throw away your medicines at www.disposemymeds.org.          This list is accurate as of 11/5/18  9:07 AM.  Always use your most recent med list.                   Brand Name Dispense Instructions for use Diagnosis    ARTIFICIAL TEARS OP      Apply to eye as needed        fexofenadine 180 MG tablet    ALLEGRA    30 tablet    Take 1 tablet (180 mg) by mouth daily    Allergic conjunctivitis of both eyes       fluorometholone 0.1 % ophthalmic susp    FML LIQUIFILM    1 Bottle    Place 1 drop into both eyes 4 times daily    Allergic conjunctivitis of both eyes       tadalafil 20 MG tablet    CIALIS     Take 20 mg by mouth as needed

## 2018-11-05 NOTE — PROGRESS NOTES
BE South is a 63 year old male presenting to the clinic with complaints of severe itching and watery eyes. Patient states it started 3 days ago and has progressively worsened. Was seen by Dr. Srivastava for follow up regarding his Central serous retinopathy. No recent illness in self or anyone at home. Patient has been using artifical tears q 10 minutes without relief. Has been doing warm compresses without relief.    POcH: Central serous both eyes s/p PDT OS  Ocular GTTS: Ketoralac both eyes three times a day    Assessment & Plan      (H10.13) Allergic conjunctivitis of both eyes  (primary encounter diagnosis)  Comment: Significant chemosis and injection, upper and lower lid edema; patient has significant pain and discomfort; given Central serous retinopathy discussed the R/B/A to steroid use; explained to patient that Central serous retinopathy would worsen with steroid use; patient chooses to try to FML given significant discomfort  Plan: fluorometholone (FML LIQUIFILM) 0.1 % ophthalmic susp, fexofenadine (ALLEGRA) 180 MG tablet, follow up in one week or sooner if needed    (H35.713) Central serous chorioretinopathy of both eyes  Plan: Follow up with Dr. Srivastava as scheduled     -----------------------------------------------------------------------------------    Patient disposition:   Return in about 1 week (around 11/12/2018). or sooner as needed.    Leonard Camejo MD  PGY-2 Ophthalmology  [phone]    Teaching statement:  Complete documentation of historical and exam elements from today's encounter can be found in the full encounter summary report (not reduplicated in this progress note). I personally obtained the chief complaint(s) and history of present illness.  I confirmed and edited as necessary the review of systems, past medical/surgical history, family history, social history, and examination findings as documented by others; and I examined the patient myself. I personally reviewed the  relevant tests, images, and reports as documented above.     I formulated and edited as necessary the assessment and plan and discussed the findings and management plan with the patient and family.    Shellie Izaguirre MD  Comprehensive Ophthalmology & Ocular Pathology  Department of Ophthalmology and Visual Neurosciences  [email or web address]  Pager [phone]

## 2018-11-07 RX ORDER — PREDNISOLONE ACETATE 10 MG/ML
1-2 SUSPENSION/ DROPS OPHTHALMIC 3 TIMES DAILY
Qty: 1 BOTTLE | Refills: 0 | Status: SHIPPED | OUTPATIENT
Start: 2018-11-07 | End: 2018-11-12

## 2018-11-12 ENCOUNTER — OFFICE VISIT (OUTPATIENT)
Dept: OPHTHALMOLOGY | Facility: CLINIC | Age: 63
End: 2018-11-12
Attending: OPHTHALMOLOGY
Payer: COMMERCIAL

## 2018-11-12 DIAGNOSIS — H10.13 ALLERGIC CONJUNCTIVITIS OF BOTH EYES: ICD-10-CM

## 2018-11-12 PROCEDURE — G0463 HOSPITAL OUTPT CLINIC VISIT: HCPCS | Mod: ZF

## 2018-11-12 RX ORDER — PREDNISOLONE ACETATE 10 MG/ML
1-2 SUSPENSION/ DROPS OPHTHALMIC 4 TIMES DAILY
Qty: 1 BOTTLE | Refills: 1 | Status: SHIPPED | OUTPATIENT
Start: 2018-11-12

## 2018-11-12 ASSESSMENT — TONOMETRY
IOP_METHOD: TONOPEN
OS_IOP_MMHG: 20
OD_IOP_MMHG: 21

## 2018-11-12 ASSESSMENT — CONF VISUAL FIELD
OS_NORMAL: 1
OD_NORMAL: 1

## 2018-11-12 ASSESSMENT — VISUAL ACUITY
METHOD: SNELLEN - LINEAR
OS_SC: 20/200
OS_PH_SC: 20/100
OD_SC: 20/40

## 2018-11-12 ASSESSMENT — SLIT LAMP EXAM - LIDS
COMMENTS: MILD EDEMA
COMMENTS: MILD EDEMA

## 2018-11-12 NOTE — MR AVS SNAPSHOT
After Visit Summary   11/12/2018    Kayode South    MRN: [id number]           Patient Information     Date Of Birth          1955        Visit Information        Provider Department      11/12/2018 8:15 AM Leonard Camejo MD; LANGUAGE [facility]        Today's Diagnoses     Allergic conjunctivitis of both eyes - Both Eyes           Follow-ups after your visit        Follow-up notes from your care team     Return in about 2 weeks (around 11/26/2018).      Your next 10 appointments already scheduled     Nov 30, 2018  8:45 AM CST   RETURN GENERAL with Leonard Camejo MD   Eye Clinic ([facility])    [address] [address]   [phone]            Jan 30, 2019  7:15 AM CST   RETURN RETINA with Rhona Srivastava MD   Eye Clinic ([facility])    [address] [address] [phone]              Who to contact     Please call your clinic at [phone] to:    Ask questions about your health    Make or cancel appointments    Discuss your medicines    Learn about your test results    Speak to your doctor            Additional Information About Your Visit        N3TWORKharNtirety Information     FastSoft gives you secure access to your electronic health record. If you see a primary care provider, you can also send messages to your care team and make appointments. If you have questions, please call your primary care clinic.  If you do not have a primary care provider, please call [phone] and they will assist you.      FastSoft is an electronic gateway that provides easy, online access to your medical records. With FastSoft, you can request a clinic appointment, read your test results, renew a prescription or communicate with your care team.     To access your existing account, please contact your [facility] or call [phone]  for assistance.        Care EveryWhere ID     This is your Care EveryWhere ID. This could be used by other organizations to access your [facility] medical records  [id number]         Blood Pressure from Last 3 Encounters:   No data found for BP    Weight from Last 3 Encounters:   08/15/18 52.5 kg (115 lb 11.2 oz)              Today, you had the following     No orders found for display         Today's Medication Changes          These changes are accurate as of 11/12/18  9:16 AM.  If you have any questions, ask your nurse or doctor.               These medicines have changed or have updated prescriptions.        Dose/Directions    prednisoLONE acetate 1 % ophthalmic susp   Commonly known as:  PRED FORTE   This may have changed:  when to take this   Used for:  Allergic conjunctivitis of both eyes        Dose:  1-2 drop   Place 1-2 drops into both eyes 4 times daily   Quantity:  1 Bottle   Refills:  1            Where to get your medicines      These medications were sent to [facility]/pharmacy #[id number]  [place], [place] - [address] [facility]  [address] [address]     Phone:  [phone]     prednisoLONE acetate 1 % ophthalmic susp                Primary Care Provider    None Specified       No primary provider on file.        Equal Access to Services     JENNIFER [facility] System[facility]IS[facility] AH: Hadmoises Campos, chuckie han, qamichell reyes, urbano dias. So [facility] [phone].    ATENCIÓN: Si habla español, tiene a mitchell disposición servicios gratuitos de asistencia lingüística. [facility] [phone].    We comply with applicable federal civil rights laws and [place] laws. We do not discriminate on the basis of race, color, national origin, age, disability, sex, sexual orientation, or gender identity.            Thank you!     Thank you for choosing EYE CLINIC  for your care. Our goal is always to provide you with excellent care. Hearing back from our patients is one way we  can continue to improve our services. Please take a few minutes to complete the written survey that you may receive in the mail after your visit with us. Thank you!             Your Updated Medication List - Protect others around you: Learn how to safely use, store and throw away your medicines at www.disposemymeds.org.          This list is accurate as of 11/12/18  9:16 AM.  Always use your most recent med list.                   Brand Name Dispense Instructions for use Diagnosis    ARTIFICIAL TEARS OP      Apply to eye as needed        fexofenadine 180 MG tablet    ALLEGRA    30 tablet    Take 1 tablet (180 mg) by mouth daily    Allergic conjunctivitis of both eyes       prednisoLONE acetate 1 % ophthalmic susp    PRED FORTE    1 Bottle    Place 1-2 drops into both eyes 4 times daily    Allergic conjunctivitis of both eyes       tadalafil 20 MG tablet    CIALIS     Take 20 mg by mouth as needed

## 2018-11-12 NOTE — PROGRESS NOTES
BE South is a 63 year old male presenting to the clinic with complaints of severe itching and watery eyes. Was found to have severe allergic conjunctivitis and started on Pred Forte. Patient is aware of the risk of worsening Central serous retinopathy with steroid use but wanted to start therapy given significant discomfort with eyes. Was started on FML but was denied by insurance so switched to Pred Forte. Reports improvement in symptoms with Pred Forte use. Still red and swollen but improved compared to previous.    POcH: Central serous both eyes s/p PDT OS  Ocular GTTS: Ketoralac both eyes three times a day    Assessment & Plan      (H10.13) Allergic conjunctivitis of both eyes  (primary encounter diagnosis)  Comment: Significant chemosis and injection has improved, now white membranes in both fornix; patient has significant pain and discomfort - improving; given Central serous retinopathy discussed the R/B/A to steroid use; explained to patient that Central serous retinopathy would worsen with steroid use; patient wants to continue on the Pred   Plan: Continue Pred, follow up in 2 weeks    (H35.333) Central serous chorioretinopathy of both eyes  Plan: Follow up with Dr. Srivastava as scheduled     -----------------------------------------------------------------------------------    Patient disposition:   Return in about 2 weeks (around 11/26/2018). or sooner as needed.    Leonard Camejo MD  PGY-2 Ophthalmology  [phone]    Teaching statement:  Complete documentation of historical and exam elements from today's encounter can be found in the full encounter summary report (not reduplicated in this progress note). I personally obtained the chief complaint(s) and history of present illness.  I confirmed and edited as necessary the review of systems, past medical/surgical history, family history, social history, and examination findings as documented by others; and I examined the patient myself. I personally  reviewed the relevant tests, images, and reports as documented above.     I formulated and edited as necessary the assessment and plan and discussed the findings and management plan with the patient and family.    Shellie Izaguirre MD  Comprehensive Ophthalmology & Ocular Pathology  Department of Ophthalmology and Visual Neurosciences  [email or web address]  Pager [phone]

## 2018-11-12 NOTE — NURSING NOTE
Chief Complaints and History of Present Illnesses   Patient presents with     Follow Up For     Allergic conjunctivitis of both eyes - Both Eyes     HPI    Affected eye(s):  Both   Symptoms:     Foreign body sensation (Comment: BE)   Tearing   Itching   No burning         Do you have eye pain now?:  No      Comments:  1 week follow up Allergic conjunctivitis of both eyes - Both Eyes  BE feel gritty and tearing  Slight improvement BE  Prednisolone tid BE  Warm compress every day BE  Annelise Rooney [facility] 8:47 AM November 12, 2018

## 2018-12-04 ENCOUNTER — PATIENT OUTREACH (OUTPATIENT)
Dept: CARE COORDINATION | Facility: CLINIC | Age: 63
End: 2018-12-04

## 2018-12-05 ENCOUNTER — OFFICE VISIT (OUTPATIENT)
Dept: OPHTHALMOLOGY | Facility: CLINIC | Age: 63
End: 2018-12-05
Payer: COMMERCIAL

## 2018-12-05 DIAGNOSIS — H35.713 CENTRAL SEROUS CHORIORETINOPATHY OF BOTH EYES: ICD-10-CM

## 2018-12-05 DIAGNOSIS — H52.4 PRESBYOPIA: Primary | ICD-10-CM

## 2018-12-05 ASSESSMENT — VISUAL ACUITY
OS_CC: J1
OD_CC: 20/70
OS_CC: 20/60
CORRECTION_TYPE: GLASSES
OD_CC: J1
METHOD: SNELLEN - LINEAR

## 2018-12-05 ASSESSMENT — CONF VISUAL FIELD
OS_NORMAL: 1
OD_NORMAL: 1
METHOD: COUNTING FINGERS

## 2018-12-05 ASSESSMENT — TONOMETRY
IOP_METHOD: ICARE
OD_IOP_MMHG: 11
OS_IOP_MMHG: 11

## 2018-12-05 ASSESSMENT — REFRACTION_WEARINGRX
OS_AXIS: 10
SPECS_TYPE: PROG
OD_ADD: +2.50
OD_CYLINDER: +1.00
OS_SPHERE: -1.25
OD_SPHERE: -1.00
OD_AXIS: 170
OS_ADD: +2.50
OS_CYLINDER: +0.50

## 2018-12-05 ASSESSMENT — REFRACTION_MANIFEST
OS_SPHERE: -1.25
OD_SPHERE: -1.00
OS_CYLINDER: +0.50
OS_AXIS: 180
OD_AXIS: 165
OD_CYLINDER: +1.00

## 2018-12-05 ASSESSMENT — SLIT LAMP EXAM - LIDS
COMMENTS: MILD EDEMA
COMMENTS: MILD EDEMA

## 2018-12-05 NOTE — NURSING NOTE
Chief Complaints and History of Present Illnesses   Patient presents with     Consult For     low vision refraction     HPI    Affected eye(s):  Both   Symptoms:     Blurred vision      Frequency:  Constant       Do you have eye pain now?:  No      Comments:  Pt here for low vision refraction.    Devora SORENSON 10:33 AM December 5, 2018

## 2018-12-05 NOTE — LETTER
12/5/2018      RE: Kayode South  [address] [place] [address]       Assessment/Plan  (H52.4) Presbyopia  (primary encounter diagnosis)  Comment: Near normal vision with corrective lenses   Plan: Discussed findings with patient. Suspect that patient has been over-corrected for near/computer work. Reduction of near add to +2.25 appears to be more appropriate for his typical computer work distance. Patient still read very well at near with this add but is welcome to RTC as needed if he is not satisfied with new glasses. Separate Rx for distance and near dispensed per patient request.     (H35.345) Central serous chorioretinopathy of both eyes  Plan: Continue care with Dr. Srivastava/[facility] retina service. RTC with vision changes.       Complete documentation of historical and exam elements from today's encounter can  be found in the full encounter summary report (not reduplicated in this progress  note). I personally obtained the chief complaint(s) and history of present illness. I  confirmed and edited as necessary the review of systems, past medical/surgical  history, family history, social history, and examination findings as documented by  others; and I examined the patient myself. I personally reviewed the relevant tests,  images, and reports as documented above. I formulated and edited as necessary the  assessment and plan and discussed the findings and management plan with the  patient and family.    Jerzy He OD

## 2018-12-05 NOTE — MR AVS SNAPSHOT
After Visit Summary   12/5/2018    Kayode South    MRN: [id number]           Patient Information     Date Of Birth          1955        Visit Information        Provider Department      12/5/2018 10:05 AM Jerzy He, OD; LANGUAGE [facility]        Today's Diagnoses     Presbyopia    -  1    Central serous chorioretinopathy of both eyes           Follow-ups after your visit        Follow-up notes from your care team     Return if symptoms worsen or fail to improve.      Your next 10 appointments already scheduled     Jan 30, 2019  7:15 AM CST   RETURN RETINA with Rhona Srivastava MD   Eye Clinic ([facility])    [place] [address]  [address] Clin [address]  [facility] [id number] [phone]              Who to contact     Please call your clinic at [phone] to:    Ask questions about your health    Make or cancel appointments    Discuss your medicines    Learn about your test results    Speak to your doctor            Additional Information About Your Visit        Aaron Andrews ApparelharCollaborative Software Initiative Information     [employer] gives you secure access to your electronic health record. If you see a primary care provider, you can also send messages to your care team and make appointments. If you have questions, please call your primary care clinic.  If you do not have a primary care provider, please call [phone] and they will assist you.      [employer] is an electronic gateway that provides easy, online access to your medical records. With [employer], you can request a clinic appointment, read your test results, renew a prescription or communicate with your care team.     To access your existing account, please contact your [facility] or call [phone] for assistance.        Care EveryWhere ID     This is your Care EveryWhere ID. This could be used by other organizations to access your [facility] medical records  [id number]          Blood Pressure from Last 3 Encounters:   No data found for BP    Weight from Last 3 Encounters:   08/15/18 52.5 kg (115 lb 11.2 oz)              We Performed the Following     REFRACTION [86292]        Primary Care Provider    None Specified       No primary provider on file.        Equal Access to Services     JENNIFER OLIVAS : Hadmoises salazar naya Somikhail, wagennyda luqadaha, qaybta kaalmada adeayah, urbano ramakrishnain hayaaestephania ortegacrista laguerre lakrupaestephania . So [facility] [phone].    ATENCIÓN: Si habla español, tiene a mitchell disposición servicios gratuitos de asistencia lingüística. Llame al [phone].    We comply with applicable federal civil rights laws and [place] laws. We do not discriminate on the basis of race, color, national origin, age, disability, sex, sexual orientation, or gender identity.            Thank you!     Thank you for choosing [facility] OPHTHALMOLOGY  for your care. Our goal is always to provide you with excellent care. Hearing back from our patients is one way we can continue to improve our services. Please take a few minutes to complete the written survey that you may receive in the mail after your visit with us. Thank you!             Your Updated Medication List - Protect others around you: Learn how to safely use, store and throw away your medicines at www.disposemymeds.org.          This list is accurate as of 12/5/18 11:09 AM.  Always use your most recent med list.                   Brand Name Dispense Instructions for use Diagnosis    ARTIFICIAL TEARS OP      Apply to eye as needed        fexofenadine 180 MG tablet    ALLEGRA    30 tablet    Take 1 tablet (180 mg) by mouth daily    Allergic conjunctivitis of both eyes       prednisoLONE acetate 1 % ophthalmic suspension    PRED FORTE    1 Bottle    Place 1-2 drops into both eyes 4 times daily    Allergic conjunctivitis of both eyes       tadalafil 20 MG tablet    CIALIS     Take 20 mg by mouth as needed

## 2019-01-28 DIAGNOSIS — H35.713 CENTRAL SEROUS CHORIORETINOPATHY OF BOTH EYES: Primary | ICD-10-CM

## 2019-01-30 ENCOUNTER — OFFICE VISIT (OUTPATIENT)
Dept: OPHTHALMOLOGY | Facility: CLINIC | Age: 64
End: 2019-01-30
Attending: OPHTHALMOLOGY
Payer: COMMERCIAL

## 2019-01-30 DIAGNOSIS — H35.713 CENTRAL SEROUS CHORIORETINOPATHY OF BOTH EYES: Primary | ICD-10-CM

## 2019-01-30 DIAGNOSIS — H35.713 CENTRAL SEROUS CHORIORETINOPATHY OF BOTH EYES: ICD-10-CM

## 2019-01-30 PROCEDURE — G0463 HOSPITAL OUTPT CLINIC VISIT: HCPCS | Mod: ZF

## 2019-01-30 PROCEDURE — T1013 SIGN LANG/ORAL INTERPRETER: HCPCS | Mod: U3,ZF

## 2019-01-30 PROCEDURE — 92134 CPTRZ OPH DX IMG PST SGM RTA: CPT | Mod: ZF | Performed by: OPHTHALMOLOGY

## 2019-01-30 ASSESSMENT — SLIT LAMP EXAM - LIDS
COMMENTS: MILD EDEMA
COMMENTS: MILD EDEMA

## 2019-01-30 ASSESSMENT — CONF VISUAL FIELD
METHOD: COUNTING FINGERS
OD_NORMAL: 1
OS_NORMAL: 1

## 2019-01-30 ASSESSMENT — REFRACTION_WEARINGRX
OS_AXIS: 10
OD_AXIS: 170
OD_CYLINDER: +1.00
OS_ADD: +2.50
OD_ADD: +2.50
OS_SPHERE: -1.25
OS_CYLINDER: +0.50
SPECS_TYPE: PROG
OD_SPHERE: -1.00

## 2019-01-30 ASSESSMENT — VISUAL ACUITY
CORRECTION_TYPE: GLASSES
OD_CC+: -2
OD_CC: 20/20
METHOD: SNELLEN - LINEAR
OS_CC: 20/30

## 2019-01-30 ASSESSMENT — TONOMETRY
OS_IOP_MMHG: 14
IOP_METHOD: TONOPEN
OD_IOP_MMHG: 13

## 2019-01-30 NOTE — PROGRESS NOTES
CC: PDT evaluation for chronic Central serous chorioretinopathy    HPI: Kayode South is a  63 year old year-old patient referred by Dr Lambert from [facility] for evaluation and treat of possible Central serous retinopathy both eyes.  Has been having recurrences for approximately two years and have been evaluated by Dr. Castro in the past. Had previously been on Eplerenone but the patient stopped this as he did not feel that it was working. No recent history of steroid medication use (oral or inhalants).  He does have cialis on his med list but says he does not take it and got it prescribed for a friend.  Does smoke and drinks coffee daily.     Interval: VA much improved since last visit due to updated MRx with Dr. He. Notes improvement; OS still worse than OD    Current Eye Meds:   Ketorolac both eyes three times a day    Retinal Imaging:    OCT 1-30-19  RE: Small pigment epithelial detachment, small pocket of subretinal fluid nasal macula stable compared to previous; trace increase in parafoveal SRF  LE: Subretinal fluid resolved; foveal/parafoveal EZ loss and RPE mottling, stable    FAF 08/15/18  RE: mottled hyperAF temp juxafoveal and nasal macula with hypoAF juxtafoveally nasally  LE: well defined parafoveal hyperAF circumfrencial with surrounding hypoAF, central hypoAF    FA and ICG 08/15/18 transits left  Left eye: early hyperF dot superior to the fovea that expands/leaks into the late frames, also apparent on ICG  Right eye: multiple areas of hyperF foveal and nasal macula, also apparent on ICG     Asses-sment and Plan:  --  1. Central serous retinopathy, both eyes   - status post photodynamic therapy (PDT) OS 8.15.18 by SM   - Optical Coherence Tomography showing resolution of subretinal fluid   - Discussed cessation of smoking, reducing coffee consumption, primary care physician evalation of sleep apnea, avoidance of steroids and viagra   - could consider avastin injections and/vs photodynamic therapy  (PDT) if fluid returns or worsens   - Discussed possible need for multiple treatments with different methods for improvement   - Use Ketorlac as needed    - observe for now. Plan to stop ketorolac and Predforte       2. Nuclear Sclerosis, both eyes   - Observe    3. Dry eye syndrome  artificial tears  Three times a day   warm compresses     return to clinic retina in 3 months with OCT both eyes    Meir Farley M.D.  PGY-3, Ophthalmology    ~~~~~~~~~~~~~~~~~~~~~~~~~~~~~~~~~~   Complete documentation of historical and exam elements from today's encounter can be found in the full encounter summary report (not reduplicated in this progress note).  I personally obtained the chief complaint(s) and history of present illness.  I confirmed and edited as necessary the review of systems, past medical/surgical history, family history, social history, and examination findings as documented by others; and I examined the patient myself.  I personally reviewed the relevant tests, images, and reports as documented above.  I personally reviewed the ophthalmic test(s) associated with this encounter, agree with the interpretation(s) as documented by the resident/fellow, and have edited the corresponding report(s) as necessary.   I formulated and edited as necessary the assessment and plan and discussed the findings and management plan with the patient and family    Rhona Srivastava MD   of Ophthalmology.  Retina Service   Department of Ophthalmology and Visual Neurosciences   [facility]  Phone: [phone]   Fax: [phone]

## 2019-01-30 NOTE — NURSING NOTE
Chief Complaints and History of Present Illnesses   Patient presents with     Central Serous Retinopathy Follow Up     3 month follow up both eyes.     Chief Complaint(s) and History of Present Illness(es)     Central Serous Retinopathy Follow Up     Associated symptoms: Negative for flashes, floaters, eye pain, dryness and redness    Comments: 3 month follow up both eyes.              Comments     Pt states vision is good with new glasses. Pt did not bring glasses to exam today.    Justina ONEAL January 30, 2019 7:14 AM

## 2020-01-06 NOTE — TELEPHONE ENCOUNTER
Kayode South [phone]   Number provided above busy times 3   Main number in demographics not in service    S/p photodynamic therapy for central serous retinopathy about 2 weeks ago    Would review improvement in vision/swelling may take 4-6 weeks   Pt schedule for f/u 9-12-18.  OCT imaging planned.  Will be able to evaluate how treatment worked at [facility].  Nelson Higgins RN 8:30 AM 08/23/18    Note to dr. Cardona for review    Referral Priority:   Routine     Referral Type:   Eval and Treat     Referral Reason:   Specialty Services Required     Requested Specialty:   Occupational Therapy     Number of Visits Requested:   1    [facility] - [facility]     Referral Priority:   Routine     Referral Type:   Eval and Treat     Referral Reason:   Specialty Services Required     Requested Specialty:   Occupational Therapy     Number of Visits Requested:   1       Impression:  Encounter Diagnoses   Name Primary?  Dislocation of right elbow, initial encounter Yes    Closed nondisplaced fracture of neck of right radius, initial encounter     Displaced fracture of coronoid process of right ulna, initial encounter for closed fracture          Treatment Plan:  X-rays were reviewed and discussed with patient today. He has stable alignment of his ulnohumeral joint and radiocapitellar joint. Okay at this point to begin progressive range of motion of his elbow per our lateral instability protocol. He does have stiffness mainly secondary to the fact that he was immobilized in a long-arm splint for 1 month prior to recent surgery as well. Plan for suture removal today placement Steri-Strips over the wound with scar care modalities to be discussed with occupational therapy. Also occupational therapy for guided range of motion. He will be fitted into a posterior long-arm orthosis today to be removed and encouraged to remove at home for range of motion as well as in therapy. Okay for normal hand hygiene and right upper extremity hygiene starting 24 hours.   I will plan to see him back in 3 to 4 weeks for repeat evaluation and imaging of his right elbow

## 2020-02-08 ENCOUNTER — HEALTH MAINTENANCE LETTER (OUTPATIENT)
Age: 65
End: 2020-02-08

## 2020-05-24 NOTE — ADDENDUM NOTE
Encounter addended by: Gala Daley OT on: 5/24/2020 10:48 AM   Actions taken: Clinical Note Signed, Flowsheet accepted, Episode resolved

## 2020-05-24 NOTE — PROGRESS NOTES
10/01/18 0800   Signing Clinician's Name / Credentials   Signing clinician's name / credentials Gala Daley OTR/BARBARA   Session Number   Session Number 1   Additional Session Number Discharge: pt did not return to complete POC   Reporting period 10/01-12/24   Recertification   Recertification Due 12/24/18   Progress Notes   Progress Note Due 12/24/18   GOALS   Goals Near Vision;Environmental Modification;Resource Education   Goals Addressed this Session Near vision;Environmental modification;Resource education   Goal 1 - Near Vision   Goal Description: Near Vision Patient will verbalize awareness of visual field Loss and demonstrate improved use of visual skills/adaptive equipment for increased independence in reading-based activities of daily living, written communication and detail ADL tasks.   Near Vision Goal Comment Goal not met. Pt to be evaluated by low vision optometry to address poorly functioning glasses. Pt did not return to complete POC   Target Date 12/24/18   Goal 3 - Environmental modification   Goal Description: Environment modification Patient will demonstrate understanding of the impact of lighting, contrast and glare on ADL activities, in conjunction with environmentally-based ADL modifications   Environmental Modification Goal Comment Goal initiated.Identified task light and head borne light to increase visual clarity. Pt did not return to complete POC   Target Date 12/24/18   Goal 4 - Resource education   Goal Description: Resource education Patient will verbalize awareness of community resources for the following:;Audio access to print materials;Access to large print materials;Access to low vision devices;Support in vocational goals   Resource Education Goal Comment not met   Target Date 12/24/18       Present Yes   Language   (Citizen of [place])   Therapy Diagnosis   Therapy  Diagnosis: Impaired ADL/IADL with deficits in Reading based ADL;Financial management;Work  performance  (fishing line and hook, seeing detail in work place)   Visual Rehab Treatment Candler   Daily Treatment Candler Self Care/Home Management   Self Care/Home Management   Skilled Intervention Training in use of magnifiers for reading;Training in use of reading glasses for reading;Instruction in task lighting placement/directionality, assessment of lighting for optimal  clarity/minimal glare   Patient Response verbalized fair to good understanding   Treatment Detail Education: pt with poor understanding of eye condition provided education on disease process and therapuetic interventions.  PT vebalized satisfaction with improved understanding of eye condition, improvied understadning of interventions available. detail work: pt presents with bifocal safety glasses, reporting that he has to lift them up to use them for some of his work tasks. 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 Trialed eschenbach headborne binocular glasses for the described working distance. Pt reported he saw more clearly with own bifocals lifted up, even though at approixmately 2 foot focal distance. Pt reports his current glasses are approximately 2 years old. Therapist recommended appointment with low vision optometrist to refract for single vision glasses at working focal distance. Pt in agreement that this would present a good, hands free solution for him. Trialed task light. Pt consistently identified benefits of full spectrum task lights for detail vision. Identfiied headborne methods of using task light, including headband style headlamp and task light attached to top of glasses. Pt verbalized plan to get new single vision glasses first, and then add light if needed. He reports he is too busy at home to read, and did not feel he would othernwise need task light.     Therapeutic Activity: Dynavision   Dynavision Cascade Dynavision Mode A (single stimulus attention, 1 minute;Dynavision Mode B  "Timed Divided Attention (1-second flash rate, 1 minute)   Dynavision Mode A (single stimulus attention, 1 minute)   Patient Score (Mode A, 1 min) 60   Dynavision Mode A (SSA, 1 Min) Comment \"safe \" score: 52+   Dynavision Mode B Timed Divided Attention (1-second flash rate, 1 minute)   Patient score, targets struck 52   Patient score, central stimulus identified (out of 10): calculate % 10/10   Dynavision Mode B Timed Divided Attention Comment Safe  score: 35+, 10/10   MN Read   Smallest print size read 0.5M   Critical print size 0.8M   Words per minute at critical print size Not measured secondary to English as second language   Equipment/Resources   Written resources provided Low vision devices  (written summary of findings, recommendations and plan)   Education   Learner Patient   Readiness Eager   Method Booklet/handout;Explanation;Demonstration   Response Verbalizes understanding;Demonstrates understanding;Needs reinforcement   Communication with other professionals   Communication with other professionals per EHR   Plan   Plan for next session lighting assessment completed, computer modifications magnification as needed for spot reading of small print   Comments   Comments Discharge: pt did not return to complete POC   Total Session Time   Timed Code Treatment Minutes 40   Total Treatment Time (sum of timed and untimed services) 85     "

## 2021-01-15 ENCOUNTER — HEALTH MAINTENANCE LETTER (OUTPATIENT)
Age: 66
End: 2021-01-15

## 2021-01-20 NOTE — PROGRESS NOTES
Assessment/Plan  (H52.4) Presbyopia  (primary encounter diagnosis)  Comment: Near normal vision with corrective lenses   Plan: Discussed findings with patient. Suspect that patient has been over-corrected for near/computer work. Reduction of near add to +2.25 appears to be more appropriate for his typical computer work distance. Patient still read very well at near with this add but is welcome to RTC as needed if he is not satisfied with new glasses. Separate Rx for distance and near dispensed per patient request.     (H35.263) Central serous chorioretinopathy of both eyes  Plan: Continue care with Dr. Srivastava/[place] [facility]. RTC with vision changes.       Complete documentation of historical and exam elements from today's encounter can  be found in the full encounter summary report (not reduplicated in this progress  note). I personally obtained the chief complaint(s) and history of present illness. I  confirmed and edited as necessary the review of systems, past medical/surgical  history, family history, social history, and examination findings as documented by  others; and I examined the patient myself. I personally reviewed the relevant tests,  images, and reports as documented above. I formulated and edited as necessary the  assessment and plan and discussed the findings and management plan with the  patient and family.    Jerzy He OD   
No

## 2021-09-05 ENCOUNTER — HEALTH MAINTENANCE LETTER (OUTPATIENT)
Age: 66
End: 2021-09-05

## 2022-02-20 ENCOUNTER — HEALTH MAINTENANCE LETTER (OUTPATIENT)
Age: 67
End: 2022-02-20

## 2022-10-23 ENCOUNTER — HEALTH MAINTENANCE LETTER (OUTPATIENT)
Age: 67
End: 2022-10-23

## 2023-04-02 ENCOUNTER — HEALTH MAINTENANCE LETTER (OUTPATIENT)
Age: 68
End: 2023-04-02